# Patient Record
Sex: FEMALE | Race: WHITE | NOT HISPANIC OR LATINO | URBAN - METROPOLITAN AREA
[De-identification: names, ages, dates, MRNs, and addresses within clinical notes are randomized per-mention and may not be internally consistent; named-entity substitution may affect disease eponyms.]

---

## 2017-06-18 ENCOUNTER — EMERGENCY (EMERGENCY)
Facility: HOSPITAL | Age: 57
LOS: 0 days | Discharge: HOME | End: 2017-06-18

## 2017-06-28 DIAGNOSIS — Z88.0 ALLERGY STATUS TO PENICILLIN: ICD-10-CM

## 2017-06-28 DIAGNOSIS — R05 COUGH: ICD-10-CM

## 2017-06-28 DIAGNOSIS — Z90.49 ACQUIRED ABSENCE OF OTHER SPECIFIED PARTS OF DIGESTIVE TRACT: ICD-10-CM

## 2017-06-28 DIAGNOSIS — Z79.2 LONG TERM (CURRENT) USE OF ANTIBIOTICS: ICD-10-CM

## 2017-06-28 DIAGNOSIS — R49.1 APHONIA: ICD-10-CM

## 2017-06-28 DIAGNOSIS — Z79.899 OTHER LONG TERM (CURRENT) DRUG THERAPY: ICD-10-CM

## 2017-08-14 ENCOUNTER — EMERGENCY (EMERGENCY)
Facility: HOSPITAL | Age: 57
LOS: 0 days | Discharge: HOME | End: 2017-08-14

## 2017-08-14 DIAGNOSIS — R49.0 DYSPHONIA: ICD-10-CM

## 2017-08-14 DIAGNOSIS — Z88.0 ALLERGY STATUS TO PENICILLIN: ICD-10-CM

## 2017-08-14 DIAGNOSIS — Z79.899 OTHER LONG TERM (CURRENT) DRUG THERAPY: ICD-10-CM

## 2017-08-14 DIAGNOSIS — R05 COUGH: ICD-10-CM

## 2017-08-14 DIAGNOSIS — Z98.890 OTHER SPECIFIED POSTPROCEDURAL STATES: ICD-10-CM

## 2017-08-14 DIAGNOSIS — Z90.710 ACQUIRED ABSENCE OF BOTH CERVIX AND UTERUS: ICD-10-CM

## 2017-08-14 DIAGNOSIS — Z90.49 ACQUIRED ABSENCE OF OTHER SPECIFIED PARTS OF DIGESTIVE TRACT: ICD-10-CM

## 2017-08-14 DIAGNOSIS — Z90.89 ACQUIRED ABSENCE OF OTHER ORGANS: ICD-10-CM

## 2017-08-14 DIAGNOSIS — Z79.2 LONG TERM (CURRENT) USE OF ANTIBIOTICS: ICD-10-CM

## 2017-08-14 DIAGNOSIS — R09.3 ABNORMAL SPUTUM: ICD-10-CM

## 2017-08-14 DIAGNOSIS — Z88.2 ALLERGY STATUS TO SULFONAMIDES: ICD-10-CM

## 2018-02-02 ENCOUNTER — TRANSCRIPTION ENCOUNTER (OUTPATIENT)
Age: 58
End: 2018-02-02

## 2018-02-05 ENCOUNTER — RESULT REVIEW (OUTPATIENT)
Age: 58
End: 2018-02-05

## 2018-02-05 ENCOUNTER — OUTPATIENT (OUTPATIENT)
Dept: OUTPATIENT SERVICES | Facility: HOSPITAL | Age: 58
LOS: 1 days | Discharge: HOME | End: 2018-02-05

## 2018-02-05 VITALS
HEART RATE: 89 BPM | SYSTOLIC BLOOD PRESSURE: 102 MMHG | TEMPERATURE: 99 F | WEIGHT: 145.06 LBS | OXYGEN SATURATION: 98 % | DIASTOLIC BLOOD PRESSURE: 56 MMHG

## 2018-02-05 VITALS — DIASTOLIC BLOOD PRESSURE: 68 MMHG | HEART RATE: 74 BPM | SYSTOLIC BLOOD PRESSURE: 100 MMHG | RESPIRATION RATE: 20 BRPM

## 2018-02-05 DIAGNOSIS — Z98.890 OTHER SPECIFIED POSTPROCEDURAL STATES: Chronic | ICD-10-CM

## 2018-02-05 DIAGNOSIS — E89.0 POSTPROCEDURAL HYPOTHYROIDISM: Chronic | ICD-10-CM

## 2018-02-05 NOTE — ASU PREOP CHECKLIST - WARM FLUIDS/WARM BLANKETS
Left message for patient to return call.  Need to inform him MD will review records from [place] when he returns to office.   no

## 2018-02-05 NOTE — ASU DISCHARGE PLAN (ADULT/PEDIATRIC). - ASU FOLLOWUP
[id number]/[facility]:  Ambulatory Surgery [facility]...

## 2018-02-05 NOTE — ASU PATIENT PROFILE, ADULT - PSH
H/O total hysterectomy with bilateral salpingo-oophorectomy (BSO)    H/O total thyroidectomy    History of bunionectomy of left great toe    History of laparoscopic cholecystectomy

## 2018-02-06 LAB — SURGICAL PATHOLOGY STUDY: SIGNIFICANT CHANGE UP

## 2018-02-07 DIAGNOSIS — Z12.11 ENCOUNTER FOR SCREENING FOR MALIGNANT NEOPLASM OF COLON: ICD-10-CM

## 2018-02-07 DIAGNOSIS — K64.4 RESIDUAL HEMORRHOIDAL SKIN TAGS: ICD-10-CM

## 2018-02-07 DIAGNOSIS — D12.8 BENIGN NEOPLASM OF RECTUM: ICD-10-CM

## 2018-02-07 DIAGNOSIS — D12.2 BENIGN NEOPLASM OF ASCENDING COLON: ICD-10-CM

## 2018-02-19 ENCOUNTER — TRANSCRIPTION ENCOUNTER (OUTPATIENT)
Age: 58
End: 2018-02-19

## 2018-02-21 ENCOUNTER — EMERGENCY (EMERGENCY)
Facility: HOSPITAL | Age: 58
LOS: 0 days | Discharge: HOME | End: 2018-02-21
Attending: EMERGENCY MEDICINE

## 2018-02-21 VITALS
RESPIRATION RATE: 20 BRPM | HEIGHT: 61 IN | DIASTOLIC BLOOD PRESSURE: 71 MMHG | SYSTOLIC BLOOD PRESSURE: 107 MMHG | WEIGHT: 136.91 LBS | HEART RATE: 83 BPM | TEMPERATURE: 100 F | OXYGEN SATURATION: 98 %

## 2018-02-21 DIAGNOSIS — Z98.890 OTHER SPECIFIED POSTPROCEDURAL STATES: Chronic | ICD-10-CM

## 2018-02-21 DIAGNOSIS — Z88.0 ALLERGY STATUS TO PENICILLIN: ICD-10-CM

## 2018-02-21 DIAGNOSIS — Z98.890 OTHER SPECIFIED POSTPROCEDURAL STATES: ICD-10-CM

## 2018-02-21 DIAGNOSIS — Z79.899 OTHER LONG TERM (CURRENT) DRUG THERAPY: ICD-10-CM

## 2018-02-21 DIAGNOSIS — Z88.2 ALLERGY STATUS TO SULFONAMIDES: ICD-10-CM

## 2018-02-21 DIAGNOSIS — R05 COUGH: ICD-10-CM

## 2018-02-21 DIAGNOSIS — Z90.710 ACQUIRED ABSENCE OF BOTH CERVIX AND UTERUS: ICD-10-CM

## 2018-02-21 DIAGNOSIS — Z90.49 ACQUIRED ABSENCE OF OTHER SPECIFIED PARTS OF DIGESTIVE TRACT: ICD-10-CM

## 2018-02-21 DIAGNOSIS — E89.0 POSTPROCEDURAL HYPOTHYROIDISM: Chronic | ICD-10-CM

## 2018-02-21 DIAGNOSIS — R50.9 FEVER, UNSPECIFIED: ICD-10-CM

## 2018-02-21 RX ORDER — DEXAMETHASONE 0.5 MG/5ML
10 ELIXIR ORAL ONCE
Qty: 0 | Refills: 0 | Status: COMPLETED | OUTPATIENT
Start: 2018-02-21 | End: 2018-02-21

## 2018-02-21 RX ADMIN — Medication 10 MILLIGRAM(S): at 13:45

## 2018-02-21 NOTE — ED PROVIDER NOTE - PROGRESS NOTE DETAILS
Patient to be discharged from ED. Any available test results were discussed with patient and/or family. Verbal instructions given, including instructions to return to ED immediately for any new, worsening, or concerning symptoms. Patient endorsed understanding. Written discharge instructions additionally given, including follow-up plan. ATTENDING NOTE:   56 y/o F with PMH of RA, thyroid CA on Synthroid, diagnosed with flu 3 days ago  at [facility], RX’d Tamiflu that she has been taking as prescribed, presents to ED for continued cough, myalgias. +Fever intermittent over this time with Tmax 103 at home. No CP, SOB, abdominal pain, N/V/D.   On exam: Pt is well-appearing, NAD, WDWN, patient sitting up in bed, providing appropriate history. NCAT. PERRLA 3mm b/l, no nystagmus, EOMI. HEENT: +pharyngeal erythema but no exudates or edema noted, no pooling of secretions. (+)Full passive ROM in neck. S1S2, no MRG. Lungs with +mild R expiratory rhonchi noted. Abdomen soft, NT/ND, no rebound or guarding, no CVAT. No leg edema, +symmetric pulses b/l. CNII-XII grossly intact. No rash.  Will get EKG, CXR and reassess.

## 2018-02-21 NOTE — ED PROVIDER NOTE - NS ED ROS FT
Review of Systems:  	•	CONSTITUTIONAL - no fever, no diaphoresis, no chills  	•	SKIN - no rash  	•	HEMATOLOGIC - no bleeding, no bruising  	•	EYES - no eye pain, no blurry vision  	•	ENT - no change in hearing, no sore throat, no ear pain or tinnitus  	•	RESPIRATORY - no shortness of breath, no cough  	•	CARDIAC - no chest pain, no palpitations  	•	GI - no abd pain, no nausea, no vomiting, no diarrhea, no constipation

## 2018-02-21 NOTE — ED PROVIDER NOTE - OBJECTIVE STATEMENT
56 yo female presents ed for evaluation of cough . patient was diagnosed with flu . patietn states cough is getting worse. no chest pain

## 2018-02-21 NOTE — ED PROVIDER NOTE - PHYSICAL EXAMINATION
--EXAM--  VITAL SIGNS: I have reviewed vs documented at present.  CONSTITUTIONAL: Well-developed; well-nourished; in no acute distress.   SKIN: Warm and dry, no acute rash.   HEAD: Normocephalic; atraumatic.  EYES: PERRL, EOM intact; conjunctiva and sclera clear. No nystagmus.  ENT: No nasal discharge; airway clear.  NECK: Supple; non tender.  CARD: S1, S2, Regular rate and rhythm.   RESP: No wheezes, rales or rhonchi.  ABD: Normal bowel sounds; soft; non-distended; non-tender.  EXT: Normal ROM.

## 2018-10-10 PROBLEM — C73 MALIGNANT NEOPLASM OF THYROID GLAND: Chronic | Status: ACTIVE | Noted: 2018-02-05

## 2018-10-18 ENCOUNTER — OUTPATIENT (OUTPATIENT)
Dept: OUTPATIENT SERVICES | Facility: HOSPITAL | Age: 58
LOS: 1 days | Discharge: HOME | End: 2018-10-18

## 2018-10-18 ENCOUNTER — APPOINTMENT (OUTPATIENT)
Dept: OBGYN | Facility: CLINIC | Age: 58
End: 2018-10-18
Payer: COMMERCIAL

## 2018-10-18 VITALS
WEIGHT: 141 LBS | HEIGHT: 65 IN | DIASTOLIC BLOOD PRESSURE: 70 MMHG | SYSTOLIC BLOOD PRESSURE: 100 MMHG | BODY MASS INDEX: 23.49 KG/M2

## 2018-10-18 DIAGNOSIS — Z98.890 OTHER SPECIFIED POSTPROCEDURAL STATES: Chronic | ICD-10-CM

## 2018-10-18 DIAGNOSIS — Z83.3 FAMILY HISTORY OF DIABETES MELLITUS: ICD-10-CM

## 2018-10-18 DIAGNOSIS — Z80.42 FAMILY HISTORY OF MALIGNANT NEOPLASM OF PROSTATE: ICD-10-CM

## 2018-10-18 DIAGNOSIS — E89.0 POSTPROCEDURAL HYPOTHYROIDISM: Chronic | ICD-10-CM

## 2018-10-18 DIAGNOSIS — Z82.49 FAMILY HISTORY OF ISCHEMIC HEART DISEASE AND OTHER DISEASES OF THE CIRCULATORY SYSTEM: ICD-10-CM

## 2018-10-18 PROBLEM — Z00.00 ENCOUNTER FOR PREVENTIVE HEALTH EXAMINATION: Status: ACTIVE | Noted: 2018-10-18

## 2018-10-18 LAB
BILIRUB UR QL STRIP: NORMAL
GLUCOSE UR-MCNC: NORMAL
HCG UR QL: 0.2 EU/DL
HGB UR QL STRIP.AUTO: NORMAL
KETONES UR-MCNC: NORMAL
LEUKOCYTE ESTERASE UR QL STRIP: NORMAL
NITRITE UR QL STRIP: NORMAL
PH UR STRIP: 5.5
PROT UR STRIP-MCNC: NORMAL
SP GR UR STRIP: 1.01

## 2018-10-18 PROCEDURE — 99396 PREV VISIT EST AGE 40-64: CPT

## 2018-10-18 PROCEDURE — 81003 URINALYSIS AUTO W/O SCOPE: CPT | Mod: QW

## 2018-10-23 ENCOUNTER — APPOINTMENT (OUTPATIENT)
Dept: OBGYN | Facility: CLINIC | Age: 58
End: 2018-10-23
Payer: COMMERCIAL

## 2018-10-23 PROCEDURE — 76830 TRANSVAGINAL US NON-OB: CPT

## 2018-10-23 PROCEDURE — 76856 US EXAM PELVIC COMPLETE: CPT | Mod: 59

## 2018-10-26 DIAGNOSIS — Z01.419 ENCOUNTER FOR GYNECOLOGICAL EXAMINATION (GENERAL) (ROUTINE) WITHOUT ABNORMAL FINDINGS: ICD-10-CM

## 2018-10-29 LAB — HPV HIGH+LOW RISK DNA PNL CVX: NOT DETECTED

## 2019-11-15 ENCOUNTER — APPOINTMENT (OUTPATIENT)
Dept: OTOLARYNGOLOGY | Facility: CLINIC | Age: 59
End: 2019-11-15

## 2020-08-25 ENCOUNTER — APPOINTMENT (OUTPATIENT)
Dept: GASTROENTEROLOGY | Facility: CLINIC | Age: 60
End: 2020-08-25
Payer: COMMERCIAL

## 2020-08-25 DIAGNOSIS — Z85.850 PERSONAL HISTORY OF MALIGNANT NEOPLASM OF THYROID: ICD-10-CM

## 2020-08-25 PROCEDURE — 99204 OFFICE O/P NEW MOD 45 MIN: CPT | Mod: 95

## 2020-08-25 RX ORDER — ESTRADIOL 0.05 MG/D
0.05 PATCH, EXTENDED RELEASE TRANSDERMAL
Refills: 0 | Status: DISCONTINUED | COMMUNITY
End: 2020-08-25

## 2020-08-25 NOTE — ASSESSMENT
[FreeTextEntry1] : 60 year old female patient average risk,  presents with 3-4 months hx of epigastric pain, not related to food intake, associated with nausea, no vomiting but minimal weight loss. No change in bowel habits but patient is reporting increased abdominal girth. no blood in the stools or vomiting blood. \par last colono in 2018 with TAs, next advised in 2021. \par \par Non specific abdominal pain\par r/o IBS/ NUD/ Post adrian biliary colic (CBC stone)\par ? intra abdominal malignancy\par will get CT scan of A/P w contrast\par EGD if unrevealing. \par Pt to call for CT result.

## 2020-08-25 NOTE — PHYSICAL EXAM
[General Appearance - Alert] : alert [] : no respiratory distress [Hearing Threshold Finger Rub Not [place]] : hearing was normal [Oriented To Time, Place, And Person] : oriented to person, place, and time [Skin Color & Pigmentation] : normal skin color and pigmentation

## 2020-08-25 NOTE — HISTORY OF PRESENT ILLNESS
[Home] : at home, [unfilled] , at the time of the visit. [Medical Office: ([facility])___] : at the medical office located in  [Verbal consent obtained from patient] : the patient, [unfilled] [FreeTextEntry4] : Shea Ivy [de-identified] : 60 year old female patient average risk,  presents with 3-4 months hx of epigastric pain, not related to food intake, associated with nausea, no vomiting but minimal weight loss. No change in bowel habits but patient is reporting increased abdominal girth. no blood in the stools or vomiting blood. \par last colono in 2018 with TAs, next advised in 2021.

## 2020-10-06 ENCOUNTER — OUTPATIENT (OUTPATIENT)
Dept: OUTPATIENT SERVICES | Facility: HOSPITAL | Age: 60
LOS: 1 days | Discharge: HOME | End: 2020-10-06

## 2020-10-06 ENCOUNTER — LABORATORY RESULT (OUTPATIENT)
Age: 60
End: 2020-10-06

## 2020-10-06 DIAGNOSIS — E89.0 POSTPROCEDURAL HYPOTHYROIDISM: Chronic | ICD-10-CM

## 2020-10-06 DIAGNOSIS — Z98.890 OTHER SPECIFIED POSTPROCEDURAL STATES: Chronic | ICD-10-CM

## 2020-10-08 DIAGNOSIS — Z11.59 ENCOUNTER FOR SCREENING FOR OTHER VIRAL DISEASES: ICD-10-CM

## 2020-10-08 NOTE — ASU PATIENT PROFILE, ADULT - PSH
H/O colonoscopy  2-3 years ago  H/O total hysterectomy with bilateral salpingo-oophorectomy (BSO)    H/O total thyroidectomy    History of bunionectomy of left great toe    History of laparoscopic cholecystectomy    History of surgery  Bladder sling with mesh placement

## 2020-10-09 ENCOUNTER — TRANSCRIPTION ENCOUNTER (OUTPATIENT)
Age: 60
End: 2020-10-09

## 2020-10-09 ENCOUNTER — OUTPATIENT (OUTPATIENT)
Dept: OUTPATIENT SERVICES | Facility: HOSPITAL | Age: 60
LOS: 1 days | Discharge: HOME | End: 2020-10-09
Payer: COMMERCIAL

## 2020-10-09 ENCOUNTER — RESULT REVIEW (OUTPATIENT)
Age: 60
End: 2020-10-09

## 2020-10-09 VITALS — RESPIRATION RATE: 17 BRPM | DIASTOLIC BLOOD PRESSURE: 51 MMHG | HEART RATE: 70 BPM | SYSTOLIC BLOOD PRESSURE: 106 MMHG

## 2020-10-09 VITALS
TEMPERATURE: 96 F | HEIGHT: 65 IN | SYSTOLIC BLOOD PRESSURE: 116 MMHG | HEART RATE: 67 BPM | DIASTOLIC BLOOD PRESSURE: 59 MMHG | RESPIRATION RATE: 17 BRPM | OXYGEN SATURATION: 99 % | WEIGHT: 134.92 LBS

## 2020-10-09 DIAGNOSIS — Z98.890 OTHER SPECIFIED POSTPROCEDURAL STATES: Chronic | ICD-10-CM

## 2020-10-09 DIAGNOSIS — E89.0 POSTPROCEDURAL HYPOTHYROIDISM: Chronic | ICD-10-CM

## 2020-10-09 PROCEDURE — 45380 COLONOSCOPY AND BIOPSY: CPT

## 2020-10-09 PROCEDURE — 88305 TISSUE EXAM BY PATHOLOGIST: CPT | Mod: 26

## 2020-10-09 PROCEDURE — 88312 SPECIAL STAINS GROUP 1: CPT | Mod: 26

## 2020-10-09 PROCEDURE — 88342 IMHCHEM/IMCYTCHM 1ST ANTB: CPT | Mod: 26

## 2020-10-09 PROCEDURE — 43239 EGD BIOPSY SINGLE/MULTIPLE: CPT | Mod: XS

## 2020-10-09 RX ORDER — LEVOTHYROXINE SODIUM 125 MCG
1 TABLET ORAL
Qty: 0 | Refills: 0 | DISCHARGE

## 2020-10-09 NOTE — H&P PST ADULT - HISTORY OF PRESENT ILLNESS
61 yo female average risk presents 3-4 months of epigastric pain not related to food intake. Patient also needs colonoscopy to evaluate CT scan findings. EGD/Colon today

## 2020-10-09 NOTE — ASU DISCHARGE PLAN (ADULT/PEDIATRIC) - CARE PROVIDER_API CALL
Vickie Clements (MD)  Gastroenterology  [address] [place] [address]  Phone: [phone]  Fax: [phone]  Follow Up Time:

## 2020-10-09 NOTE — ASU DISCHARGE PLAN (ADULT/PEDIATRIC) - CALL YOUR DOCTOR IF YOU HAVE ANY OF THE FOLLOWING:
Nausea and vomiting that does not stop/Inability to tolerate liquids or foods/Excessive diarrhea/Fever greater than (need to indicate Fahrenheit or Celsius)/Bleeding that does not stop/Pain not relieved by Medications

## 2020-10-14 LAB — SURGICAL PATHOLOGY STUDY: SIGNIFICANT CHANGE UP

## 2020-10-15 LAB — SURGICAL PATHOLOGY STUDY: SIGNIFICANT CHANGE UP

## 2020-10-16 DIAGNOSIS — K64.4 RESIDUAL HEMORRHOIDAL SKIN TAGS: ICD-10-CM

## 2020-10-16 DIAGNOSIS — K29.50 UNSPECIFIED CHRONIC GASTRITIS WITHOUT BLEEDING: ICD-10-CM

## 2020-10-16 DIAGNOSIS — K44.9 DIAPHRAGMATIC HERNIA WITHOUT OBSTRUCTION OR GANGRENE: ICD-10-CM

## 2020-10-16 DIAGNOSIS — Z88.2 ALLERGY STATUS TO SULFONAMIDES: ICD-10-CM

## 2020-10-16 DIAGNOSIS — Z85.850 PERSONAL HISTORY OF MALIGNANT NEOPLASM OF THYROID: ICD-10-CM

## 2020-10-16 DIAGNOSIS — R10.13 EPIGASTRIC PAIN: ICD-10-CM

## 2020-10-16 DIAGNOSIS — Z88.0 ALLERGY STATUS TO PENICILLIN: ICD-10-CM

## 2020-10-16 DIAGNOSIS — K57.30 DIVERTICULOSIS OF LARGE INTESTINE WITHOUT PERFORATION OR ABSCESS WITHOUT BLEEDING: ICD-10-CM

## 2020-10-16 DIAGNOSIS — M06.9 RHEUMATOID ARTHRITIS, UNSPECIFIED: ICD-10-CM

## 2020-10-27 PROBLEM — M06.9 RHEUMATOID ARTHRITIS, UNSPECIFIED: Chronic | Status: ACTIVE | Noted: 2020-10-09

## 2021-03-09 ENCOUNTER — APPOINTMENT (OUTPATIENT)
Dept: GASTROENTEROLOGY | Facility: CLINIC | Age: 61
End: 2021-03-09
Payer: COMMERCIAL

## 2021-03-09 PROCEDURE — 99213 OFFICE O/P EST LOW 20 MIN: CPT | Mod: 95

## 2021-03-09 RX ORDER — PEG-3350, SODIUM SULFATE, SODIUM CHLORIDE, POTASSIUM CHLORIDE, SODIUM ASCORBATE AND ASCORBIC ACID 7.5-2.691G
100 KIT ORAL
Qty: 1 | Refills: 0 | Status: DISCONTINUED | COMMUNITY
Start: 2020-09-15 | End: 2021-03-09

## 2021-03-09 NOTE — PHYSICAL EXAM
[General Appearance - Alert] : alert [Hearing Threshold Finger Rub Not [place]] : hearing was normal [] : no respiratory distress [Oriented To Time, Place, And Person] : oriented to person, place, and time

## 2021-03-09 NOTE — HISTORY OF PRESENT ILLNESS
[Home] : at home, [unfilled] , at the time of the visit. [Verbal consent obtained from patient] : the patient, [unfilled] [Medical Office: ([facility])___] : at the medical office located in  [FreeTextEntry4] : Shea Ivy [de-identified] : 60 year old female patient average risk,  presents with 3-4 months hx of epigastric pain, not related to food intake, associated with nausea, no vomiting but minimal weight loss. No change in bowel habits but patient is reporting increased abdominal girth. no blood in the stools or vomiting blood. \par last colono in 2018 with TAs, next advised in 2021. \par Now s/p EGD, colonoscopy which were unremarkable.

## 2021-03-09 NOTE — ASSESSMENT
[FreeTextEntry1] : 60 year old female patient average risk,  presents with 3-4 months hx of epigastric pain, not related to food intake, associated with nausea, no vomiting but minimal weight loss. No change in bowel habits but patient is reporting increased abdominal girth. no blood in the stools or vomiting blood. \par last colono in 2018 with TAs, next advised in 2021. \par \par Non specific abdominal pain, no alarm signs\par Likely NUD\par  CT scan of A/P w contrast showed diffuse colon thickening\par EGD, colonoscopy were unremarkable.\par Rec: pantoprazole daily \par Partial gluten free diet since there was mild villous blunting and lymphocytosis. \par RTC PRN

## 2022-06-14 ENCOUNTER — NON-APPOINTMENT (OUTPATIENT)
Age: 62
End: 2022-06-14

## 2022-06-15 ENCOUNTER — APPOINTMENT (OUTPATIENT)
Dept: OBGYN | Facility: CLINIC | Age: 62
End: 2022-06-15
Payer: COMMERCIAL

## 2022-06-15 VITALS — HEIGHT: 65 IN | BODY MASS INDEX: 21.49 KG/M2 | TEMPERATURE: 97.8 F | WEIGHT: 129 LBS

## 2022-06-15 VITALS — SYSTOLIC BLOOD PRESSURE: 102 MMHG | DIASTOLIC BLOOD PRESSURE: 68 MMHG

## 2022-06-15 DIAGNOSIS — R19.8 OTHER SPECIFIED SYMPTOMS AND SIGNS INVOLVING THE DIGESTIVE SYSTEM AND ABDOMEN: ICD-10-CM

## 2022-06-15 DIAGNOSIS — R10.2 PELVIC AND PERINEAL PAIN: ICD-10-CM

## 2022-06-15 DIAGNOSIS — K52.3 INDETERMINATE COLITIS: ICD-10-CM

## 2022-06-15 DIAGNOSIS — Z87.898 PERSONAL HISTORY OF OTHER SPECIFIED CONDITIONS: ICD-10-CM

## 2022-06-15 DIAGNOSIS — N95.2 POSTMENOPAUSAL ATROPHIC VAGINITIS: ICD-10-CM

## 2022-06-15 DIAGNOSIS — Z01.419 ENCOUNTER FOR GYNECOLOGICAL EXAMINATION (GENERAL) (ROUTINE) W/OUT ABNORMAL FINDINGS: ICD-10-CM

## 2022-06-15 PROCEDURE — 99204 OFFICE O/P NEW MOD 45 MIN: CPT

## 2022-06-15 RX ORDER — LEVOTHYROXINE SODIUM 125 UG/1
125 TABLET ORAL
Refills: 0 | Status: COMPLETED | COMMUNITY
End: 2022-06-15

## 2022-06-15 RX ORDER — ESTRADIOL 0.1 MG/G
0.1 CREAM VAGINAL
Qty: 1 | Refills: 5 | Status: ACTIVE | COMMUNITY
Start: 2022-06-15 | End: 1900-01-01

## 2022-06-15 RX ORDER — PANTOPRAZOLE 40 MG/1
40 TABLET, DELAYED RELEASE ORAL DAILY
Qty: 30 | Refills: 6 | Status: COMPLETED | COMMUNITY
Start: 2020-10-09 | End: 2022-06-15

## 2022-06-15 NOTE — PHYSICAL EXAM
[Awake] : awake [Alert] : alert [Soft] : soft [Oriented x3] : oriented to person, place, and time [Vulvar Atrophy] : vulvar atrophy [Labia Majora] : labia major [Labia Minora] : labia minora [Normal] : clitoris [Atrophy] : atrophy [Absent] : absent [Uterine Adnexae] : were not tender and not enlarged [Acute Distress] : no acute distress [Tender] : non tender [Distended] : not distended [de-identified] : Tender to light touch, no discrete lesions. [FreeTextEntry3] : Atrophic [FreeTextEntry4] : Tender to light touch throughout the anterior vagina. Thickened anterior pall consistent with mesh clearly palpable, no evidence of mesh erosion. [FreeTextEntry7] : No palpable masses. Transvaginal ultrasound performed, no masses or free fluid identified.

## 2022-06-17 LAB — HPV HIGH+LOW RISK DNA PNL CVX: NOT DETECTED

## 2022-06-21 ENCOUNTER — NON-APPOINTMENT (OUTPATIENT)
Age: 62
End: 2022-06-21

## 2022-06-21 LAB
A VAGINAE DNA VAG QL NAA+PROBE: NORMAL
BVAB2 DNA VAG QL NAA+PROBE: NORMAL
C KRUSEI DNA VAG QL NAA+PROBE: NEGATIVE
C TRACH RRNA SPEC QL NAA+PROBE: NEGATIVE
CYTOLOGY CVX/VAG DOC THIN PREP: ABNORMAL
MEGA1 DNA VAG QL NAA+PROBE: NORMAL
N GONORRHOEA RRNA SPEC QL NAA+PROBE: NEGATIVE
T VAGINALIS RRNA SPEC QL NAA+PROBE: NEGATIVE

## 2022-08-22 ENCOUNTER — APPOINTMENT (OUTPATIENT)
Dept: UROGYNECOLOGY | Facility: CLINIC | Age: 62
End: 2022-08-22

## 2022-08-22 VITALS
DIASTOLIC BLOOD PRESSURE: 58 MMHG | SYSTOLIC BLOOD PRESSURE: 105 MMHG | HEIGHT: 65 IN | HEART RATE: 72 BPM | BODY MASS INDEX: 22.49 KG/M2 | WEIGHT: 135 LBS

## 2022-08-22 DIAGNOSIS — N39.41 URGE INCONTINENCE: ICD-10-CM

## 2022-08-22 DIAGNOSIS — D64.9 ANEMIA, UNSPECIFIED: ICD-10-CM

## 2022-08-22 DIAGNOSIS — R31.21 ASYMPTOMATIC MICROSCOPIC HEMATURIA: ICD-10-CM

## 2022-08-22 DIAGNOSIS — Z87.440 PERSONAL HISTORY OF URINARY (TRACT) INFECTIONS: ICD-10-CM

## 2022-08-22 DIAGNOSIS — M79.18 MYALGIA, OTHER SITE: ICD-10-CM

## 2022-08-22 DIAGNOSIS — R23.2 FLUSHING: ICD-10-CM

## 2022-08-22 DIAGNOSIS — M06.9 RHEUMATOID ARTHRITIS, UNSPECIFIED: ICD-10-CM

## 2022-08-22 DIAGNOSIS — K30 FUNCTIONAL DYSPEPSIA: ICD-10-CM

## 2022-08-22 DIAGNOSIS — N81.6 RECTOCELE: ICD-10-CM

## 2022-08-22 PROCEDURE — 51701 INSERT BLADDER CATHETER: CPT

## 2022-08-22 PROCEDURE — 99205 OFFICE O/P NEW HI 60 MIN: CPT | Mod: 25

## 2022-08-22 RX ORDER — LEVOTHYROXINE SODIUM 88 UG/1
88 TABLET ORAL
Refills: 0 | Status: ACTIVE | COMMUNITY

## 2022-08-22 RX ORDER — CYCLOBENZAPRINE HYDROCHLORIDE 5 MG/1
5 TABLET, FILM COATED ORAL
Qty: 60 | Refills: 1 | Status: ACTIVE | COMMUNITY
Start: 2022-08-22 | End: 1900-01-01

## 2022-08-22 RX ORDER — TROSPIUM CHLORIDE 20 MG/1
20 TABLET, FILM COATED ORAL
Qty: 60 | Refills: 1 | Status: ACTIVE | COMMUNITY
Start: 2022-08-22 | End: 1900-01-01

## 2022-08-22 NOTE — DISCUSSION/SUMMARY
[FreeTextEntry1] : \par Myalgia-\par Discussed the pathophysiology of the above condition. Reviewed management options including medications (oral or vaginal suppository), injections, pelvic floor physical therapy, or referral for possible pudendal nerve blocks. The patient agrees to a referral to PT and medical management. The risks and benefits of flexeril was reviewed.\par \par Rectocele-\par The patient was counseled regarding the possible natural progression of prolapse and the clinical consequences of worsening prolapse. The stage and the location of the prolapse was reviewed with the patient. She was counseled regarding the management strategies including observation, pelvic floor physical therapy, pessary placement (would need to have better control of the myalgia before trying a pessary) and surgery. THE PATIENT WAS ADVISED THAT A RECTOCELE DOES NOT CAUSE VAGINAL BURNING, PAIN, URGE INCONTINENCE OR UTIS. The patient voiced understanding and agrees with a referral to PT.\par \par IF THE PATIENT CONTINUES TO HAVE VAGINAL BURNING AT HER FOLLOWUP VISIT, I WILL THEN RECOMMEND STARTING GABAPENTIN.\par \par Urge incontinence-\par The pathophysiology of the above condition was discussed with the patient. The patient was given information and education on pelvic floor muscle exercises/rehabilitation, avoidance of dietary bladder irritants, and other strategies to improve bladder control, such as scheduled voiding. She was counseled regarding further management strategies for overactive bladder and urge incontinence including pelvic floor physical therapy, medications or surgical management. The patient voiced understanding and agrees with diet changes, referral to PT, further evaluation of the bladder with a cystoscopy, and medical management. The risks and benefits of trospium was reviewed. We will follow up on her urine tests.\par \par History of UTI-\par Advised the patient that recurrent UTIs are defined as having 3 or more positive urine culture in 1 year or 2 or more in 6 months, which she has not had. Advised to call the office if she feels like she has an infection so that we can arrange testing of her urine. If she keeps getting infections then I will recommend a workup of further evaluation of her kidneys and bladder and further prevention treatment including estrogen vaginal cream and daily antibiotic suppression. The patient voiced understanding and agrees with the plan.\par \par \par \par \par \par \par \par \par \par if continues to have vaginal bulring at followup will recommend gabapentin

## 2022-08-22 NOTE — HISTORY OF PRESENT ILLNESS
[FreeTextEntry1] : \par Pt with pelvic floor dysfunction here for urogynecologic evaluation. She describes: \par Referring provider: Dr Reddy\par \par Chief PFD: pelvic pain and prolapse \par \par 5/2/2022: UCX: mixed suad\par \par Pelvic organ prolapse: s/p c/s x1, s/p tubal, s/p adrian, s/p KRYSTINA/BS0 (endometriosis), +bulge with standing for years worsening, no splinting\par Stress urinary incontinence: seldom leaking with coughing sneezing and laughing  s/p sling, unable to urinate after sling, had to straight cath daily for 2 years improved without surgical intervention. \par Overactive bladder syndrome: + urgency, + frequency and leaking once daily for years. Tried VESIcare in the past did not help. no glaucoma, pain that improves with urination \par Voiding dysfunction: yes Incomplete bladder emptying, no hesitancy \par Lower urinary tract/vaginal symptoms: 3 UTIs per year, no hematuria, no dysuria, no bladder pain \par Fecal incontinence: denies \par Defecatory dysfunction: mostly sausage\par Sexual dysfunction: pain with deep penetration, no leaking during sex \par Pelvic pain: pain on the left lower abdomen intermittent, cramping, 8/10 in intensity, worse with full bladder and intercourse better with Aleve, does not have it now \par Vaginal dryness: yes improving with estrace cream, now using every other day\par \par Her pelvic floor symptoms are significantly bothersome and negatively impacting her quality of life. \par \par

## 2022-08-22 NOTE — COUNSELING
[FreeTextEntry1] : \par We will notify you of the urine results if they are abnormal\par \par Please call the office if you feel like you have an infection so that we can arrange testing of your urine. If you keep getting infections then I will recommend further evaluation of your kidneys\par \par Please only drink max 80 ounces of water per day\par \par Please start the trospium (bladder medication) twice a day for the urgency and frequency\par \par Please start the flexeril (muscle relaxant) twice a day for the pain. It can make you sleepy\par \par Please call my office if you have any issues with the cost or side effects of the medication.\par \par Schedule 6 week cystoscopy (UUI) with Dr Estrada. Please come with a full bladder\par \par Referral to pelvic floor physical therapy\par \par [facility]\par [address] [place] [address]\par Phone: [phone]\par \par [facility]\par [place], [place]\par \par \par Please call [facility] to see if they have pelvic floor physical therapy

## 2022-08-22 NOTE — PHYSICAL EXAM
[Chaperone Present] : A chaperone was present in the examining room during all aspects of the physical examination [FreeTextEntry1] : Void:  325cc\par PVR:  40cc\par Urethra was prepped in sterile fashion and then a sterile non- indwelling catheter (14F) was used by me to drain the bladder. The patient tolerated the procedure well.\par Indication: history of uti\par \par GH: 4.5   pB: 3.5   TVL: 7.5  C: -6  D: n/a  Aa: -3  Ba:  -3 Ap: 0 Bp: 0\par  \par Well healed incision: laparoscopic, Pfannenstiel\par normal perineal sensation\par normal perineal reflexes\par negative cough stress test\par positive atrophy\par fixed urethra\par positive bilateral levator ani spasm and tenderness\par mild urethral tenderness\par mild bladder tenderness\par mild cuff tenderness\par surgically absent uterus and cervix\par no Mesh exposure\par 1/5 Keg[place]\par

## 2022-08-23 LAB
APPEARANCE: CLEAR
BILIRUBIN URINE: NEGATIVE
BLOOD URINE: NEGATIVE
COLOR: COLORLESS
GLUCOSE QUALITATIVE U: NEGATIVE
KETONES URINE: NEGATIVE
LEUKOCYTE ESTERASE URINE: NEGATIVE
NITRITE URINE: NEGATIVE
PH URINE: 6
PROTEIN URINE: NEGATIVE
SPECIFIC GRAVITY URINE: 1.01
UROBILINOGEN URINE: NORMAL

## 2022-08-24 LAB — BACTERIA UR CULT: NORMAL

## 2022-11-11 ENCOUNTER — APPOINTMENT (OUTPATIENT)
Dept: UROGYNECOLOGY | Facility: CLINIC | Age: 62
End: 2022-11-11

## 2023-09-18 NOTE — ASU DISCHARGE PLAN (ADULT/PEDIATRIC) - ACCOMPANIED BY
Pt talking to every staff member about being frustrated with poor sleep overnight. Pt sleeping throughout the day. Denies SI/HI or hallucination. Pt social with peers. Walking the halls at times. Denies any question or concern at this time. Family

## 2024-08-08 NOTE — ASU PATIENT PROFILE, ADULT - NS PRO INFO GIVEN TO
Need for Home Care Service was communicated by AMARIS on 8/7/2024.       Spoke with grand daughter regarding need for home care. Patient is agreeable to home care services. The patient will benefit from home care services of RN/PT/OT (disciplines, skilled need)    Homebound criteria was discussed in detail and grand daughter verbalizes understanding.   Patient meets homebound criteria because Urinary tract infection without hematuria.     Information provided to grand daughter along with [facility] contact information. Patient/caregiver understands that home care is an intermittent level of care and visits may not occur daily. Patient/caregiver is teachable and willing to learn, yes.      Address, phone number, email, and insurance verified with grand daughter as per Epic records. Best contact number is daughter at [phone] or [phone].    Patient NA has/does not have any special communication needs. Those special communication needs are NA.     Patient does not have active [employer] [facility].  Patient does not have video visit capability. (tablet, smartphone, or computer with a video camera).    Patient's support system is family (name of support system)      Active with a skilled home health agency prior to admission? No (Yes/No).     Does the patient have any unskilled agencies in the home, such as caregiver, cleaning services, meal assistance, private nurse? No (Yes/No)    Verified with patient that following provider is Dr. Sesay and willing to follow for home care.    Any scheduling requests: late morning    Presence of bedbugs and/or infestation of any bugs in the home: NA    Provided patient with the following 1st visit reminders:   Have insurance card, medications, and vitamins and OTCs available to review with    Any discharge paperwork for the  to review available   Please write down any questions you have, to review with nurse/therapist during the first  patient visit   For safety, we ask that any pets or firearms are secured in the home prior to nurse/therapist visiting.    Liaison will continue to follow until discharge.   Anticipated dc date is 8/7/2024.